# Patient Record
Sex: FEMALE | Race: WHITE
[De-identification: names, ages, dates, MRNs, and addresses within clinical notes are randomized per-mention and may not be internally consistent; named-entity substitution may affect disease eponyms.]

---

## 2020-01-04 NOTE — PCM.POSTAN
POST ANESTHESIA ASSESSMENT





- MENTAL STATUS


Mental Status: Alert





- VITAL SIGNS


Vital Signs: 


 Last Vital Signs











Temp  36.1 C   01/04/20 01:57


 


Pulse  66   01/04/20 02:20


 


Resp  8 L  01/04/20 02:20


 


BP  102/69   01/04/20 02:20


 


Pulse Ox  98   01/04/20 02:20














- RESPIRATORY


Respiratory Status: Respiratory Rate WNL





- CARDIOVASCULAR


CV Status: Pulse Rate WNL





- GASTROINTESTINAL


GI Status: No Symptoms


Free Text/Narrative:: 





Nausea from OR resolved.





- PAIN


Pain Score: 0 (Spinal regressing)





- POST OP HYDRATION


Hydration Status: Adequate & Stable (Doing well.  Will move to phase 2.)

## 2020-01-04 NOTE — PCM48HPAN
Post Anesthesia Note





- EVALUATION WITHIN 48HRS OF ANESTHETIC


Vital Signs in Normal Range: Yes


Patient Participated in Evaluation: Yes


Respiratory Function Stable: Yes


Airway Patent: Yes


Cardiovascular Function Stable: Yes


Hydration Status Stable: Yes


Pain Control Satisfactory: Yes (Very comfortable.  )


Nausea and Vomiting Control Satisfactory: Yes


Mental Status Recovered: Yes


Vital Signs: 


 Last Vital Signs











Temp  36.1 C   01/04/20 07:23


 


Pulse  78   01/04/20 07:23


 


Resp  18   01/04/20 07:23


 


BP  119/69   01/04/20 07:23


 


Pulse Ox  98   01/04/20 07:23














- COMMENTS/OBSERVATIONS


Free Text/Narrative:: 





Doing well.  No problems noted at present.  Progressing well.

## 2020-01-04 NOTE — PCM.PREANE
Preanesthetic Assessment





- Procedure


Proposed Procedure: 





Version possible .





- Anesthesia/Transfusion/Family Hx


Anesthesia History: Prior Anesthesia Without Reaction


Family History of Anesthesia Reaction: No





- Review of Systems


General: No Symptoms


Pulmonary: No Symptoms


Cardiovascular: No Symptoms


Gastrointestinal: Other (Some nausea at present)


Neurological: No Symptoms, Other (Hx Fibro)


Other: Reports: None, Depression





- Physical Assessment


NPO Status Date: 20


NPO Status Time: 00:05


Height: 1.68 m


Weight: 87.997 kg


ASA Class: 2E


Mental Status: Alert & Oriented x3


Airway Class: Mallampati = 2


Dentition: Reports: Normal Dentition


Thyro-Mental Finger Breadths: 3


Mouth Opening Finger Breadths: 3


ROM/Head Extension: Full


Lungs: Clear to Auscultation


Cardiovascular: Regular Rate





- Lab


Values: 





 Laboratory Last Values











WBC  9.35 K/uL (4.0-11.0)   20  23:38    


 


RBC  4.40 M/uL (4.30-5.90)   20  23:38    


 


Hgb  12.5 g/dL (12.0-16.0)   20  23:38    


 


Hct  37.3 % (36.0-46.0)   20  23:38    


 


MCV  84.8 fL (80.0-98.0)   20  23:38    


 


MCH  28.4 pg (27.0-32.0)   20  23:38    


 


MCHC  33.5 g/dL (31.0-37.0)   20  23:38    


 


RDW Std Deviation  42.8 fl (28.0-62.0)   20  23:38    


 


RDW Coeff of Lissa  14 % (11.0-15.0)   20  23:38    


 


Plt Count  219 K/uL (150-400)   20  23:38    


 


MPV  9.60 fL (7.40-12.00)   20  23:38    


 


Nucleated RBC %  0.0 /100WBC  20  23:38    


 


Nucleated RBCs #  0 K/uL  20  23:38    














- Allergies


Allergies/Adverse Reactions: 


 Allergies











Allergy/AdvReac Type Severity Reaction Status Date / Time


 


No Known Allergies Allergy   Verified 20 23:22














- Blood


Blood Available: No





- Anesthesia Plan


Pre-Op Medication Ordered: None





- Acknowledgements


Anesthesia Type Planned: Spinal


Pt an Appropriate Candidate for the Planned Anesthesia: Yes


Alternatives and Risks of Anesthesia Discussed w Pt/Guardian: Yes


Pt/Guardian Understands and Agrees with Anesthesia Plan: Yes


Additional Comments: 





Hx noted.  Discussed.  ? answered.  Wishes to proceed.





PreAnesthesia Questionnaire


Other Gastrointestinal History: H. Pylori





- HOME MEDS


Home Medications: 


 Home Meds





Pnv No.95/Ferrous Fum/Folic AC [Prenatal Tablet] 1 tab PO DAILY 20 [

History]


Sertraline [Zoloft] 1 tab PO DAILY 20 [History]











- CURRENT (IN HOUSE) MEDS


Current Meds: 





 Current Medications





Lactated Ringer's (Ringers, Lactated)  1,000 mls @ 500 mls/hr IV BOLUS ANA


Oxytocin/Sodium Chloride (Oxytocin 30 Unit/500 Ml-Ns)  30 unit in 500 mls @ 250 

mls/hr IV TITRATE ANA


Sodium Chloride (Saline Flush)  10 ml FLUSH ASDIRECTED PRN


   PRN Reason: Keep Vein Open


Sodium Chloride (Saline Flush)  2.5 ml FLUSH ASDIRECTED PRN


   PRN Reason: Keep Vein Open


Sodium Chloride (Normal Saline)  10 ml IV ASDIRECTED PRN


   PRN Reason: IV Use





Discontinued Medications





Citric Acid/Sodium Citrate (Bicitra Solution)  30 ml PO ONETIME ONE


   Stop: 20 23:25


Cefazolin Sodium/Dextrose 2 gm (/ Premix)  50 mls @ 100 mls/hr IV ONETIME ONE


   Stop: 20 23:53

## 2020-01-04 NOTE — OR
SURGEON:

Arpan Kelly MD

 

DATE OF PROCEDURE:  2020

 

INDICATION:

A 24-year-old, -0-1-1 female, at 37 weeks and 3 days, presenting with

spontaneous labor.  The patient had regular contractions and progressed from 

2cm in the office to 5 cm.  Baby was in breech presentation and was

scheduled for external cephalic version. She desired a trial of external 
cephalic 

version before proceeding with primary  section. Complete breech 

presentation was confirmed by bedside ultrasound.  External

cephalic version was attempted at bedside and was unsuccessful.  Fetal heart

rate was category 1 tracing.  After discussion with the patient, she was

agreeable to  section.  Risks of procedure were discussed with the

patient.  Questions answered and consent was signed.

 

PREOPERATIVE DIAGNOSES:

1. Garcia intrauterine pregnancy at 37 weeks and 1 day.

2. Complete breech presentation.

 

POSTOPERATIVE DIAGNOSES:

1. Garcia intrauterine pregnancy at 37 weeks and 1 day.

2. Complete breech presentation.

 

PROCEDURE:

Low-transverse  section.

 

ESTIMATED BLOOD LOSS:

800 mL.

 

ANESTHESIA:

Spinal.

 

ANESTHESIOLOGIST:

Jose Luis Bill M.D.

 

FINDINGS:

Garcia intrauterine pregnancy in complete breech presentation.  

Viable male fetus. Weight of 7lbs 15oz. Apgar of 8 and 9.  

Normal appearing uterus, bilateral fallopian tubes and ovaries.

 

DESCRIPTION OF PROCEDURE:

The patient was brought to the operating room.  She received 2 g of Ancef and

pneumatic stockings.  Spinal anesthesia was applied.  Wagner was placed.  The

abdomen was prepped with chlorhexidine in sterile fashion.  She was draped and

tested for anesthesia, the spinal was adequate.  A Pfannenstiel incision was

made with a scalpel and dissected down to fascia.  Sites of bleeding were

cauterized.  The fascia was cleared of subcutaneous tissue.  The fascia was then

incised in the midline and extended laterally with curved Kamara scissors.  
Kocher 

clamps were placed on the superior fascial edge.  The rectus muscles were then 

 from the fascia by blunt dissection and using Kamara scissors.  The 
rectus 

muscle was  inferiorly in similar fashion.  The peritoneum was 
identified 

and an opening was made bluntly, then stretched.  The Armen retractor was 

placed in the peritoneal cavity.  The bladder was noted to be far away from the 

site of incision.  The uterus was incised transversely at the lower uterine 
segment 

using scalpel and extended bluntly.  Clear amniotic fluid was noted.  The 
infant 

was noted to be in complete breech presentation. The hips were grasped and 
turned 

sacrum anterior and delivered through the hysterotomy followed by the legs.  

The right arm was delivered by rotating the right shoulder anteriorly and 
sweeping 

the arm towards the body.  The left arm and shoulder were delivered in similar 

fashion.  The fetal head was then delivered with flexion of the fetal chin. The 
nose 

and mouth were suctioned. The umbilical cord was clamped and cut after 

30 seconds and no longer pulsating. The infant was pink, crying vigorously and 

moving all extremities.  The infant was handed over to nursery staff and 
pediatrician.  



The cord gases were obtained. The placenta was delivered manually.  The uterus 

was cleared of any remaining membranes with a clean lap.  Allis clamps were 
used 

to grasp the angles and lower edges of the incision.  The uterine incision was 
closed 

using 0 Monocryl in running nonlocking fashion.  Small sites of bleeding were 
noted at the

midline.  Figure-of-eight sutures were placed with 0 Vicryl for hemostasis.  The

uterus was firm.  Paracolic gutters were cleared of any clots.  The incision was

checked again for hemostasis. Small areas of oozing were cauterized with Bovie.

The peritoneum was grasped by Wesley clamps and closed using 2-0 Vicryl in

running fashion. The rectus muscles were examined and found to be hemostatic.

The fascia was closed using 0 Vicryl suture in a running fashion.  The

subcutaneous tissue was irrigated and bleeding areas cauterized.  The

subcutaneous layer was closed with running suture of 0 chromic.  The skin was

closed subcuticularly with 3-0 Monocryl on a Ector needle.  Telfa and ABD

dressings were placed over the incision.  



The patient was stable and transferred to recovery room.  She was given 
postoperative 

care instructions.

 

 

ROBERT DAVIS

DD:  2020 04:56:46

DT:  2020 05:30:42

Job #:  [id number]

KEITH

## 2020-01-05 NOTE — PCM.PNPP
- General Info


Date of Service: 20


Functional Status: Reports: Pain Controlled, Tolerating Diet, Ambulating, 

Urinating, Other (Passing flatus. Bleeding minimal.)





- Review of Systems


General: Reports: No Symptoms


HEENT: Reports: No Symptoms


Pulmonary: Reports: No Symptoms


Cardiovascular: Reports: No Symptoms


Gastrointestinal: Reports: No Symptoms


Genitourinary: Reports: No Symptoms


Musculoskeletal: Reports: No Symptoms


Skin: Reports: No Symptoms


Neurological: Reports: No Symptoms


Psychiatric: Reports: No Symptoms





- Patient Data


Vital Signs - Most Recent: 


 Last Vital Signs











Temp  36.4 C   20 07:56


 


Pulse  77   20 07:56


 


Resp  16   20 07:56


 


BP  104/55 L  20 07:56


 


Pulse Ox  97   20 07:56











Weight - Most Recent: 194 lb


I&O - Last 24 Hours: 


 Intake & Output











 20





 22:59 06:59 14:59


 


Intake Total 1360 900 450


 


Output Total 1150 1000 900


 


Balance 210 -100 -450











Lab Results - Last 24 Hours: 


 Laboratory Results - last 24 hr











  20 Range/Units





  05:55 


 


Hgb  10.5 L  (12.0-16.0)  g/dL


 


Hct  33.0 L  (36.0-46.0)  %











Med Orders - Current: 


 Current Medications





Acetaminophen (Tylenol)  650 mg PO Q6H PRN


   PRN Reason: Pain (moderate 4-6)


Hydrocodone Bitart/Acetaminophen (Norco 325-5 Mg)  1 tab PO Q4H PRN


   PRN Reason: Pain (moderate 4-6)


Bisacodyl (Dulcolax)  10 mg RECTAL ONETIME PRN


   PRN Reason: Constipation


Diphenhydramine HCl (Benadryl)  25 mg IVPUSH Q6H PRN


   PRN Reason: Itching or Nausea


Docusate Sodium (Colace)  100 mg PO BID [facility]


   Last Admin: 20 08:47 Dose:  100 mg


Emollient Ointment (Lansinoh Hpa)  0 gm TOP ASDIRECTED PRN


   PRN Reason: Sore Nipples


Lactated Ringer's (Ringers, Lactated)  1,000 mls @ 500 mls/hr IV BOLUS [facility]


   Last Admin: 01/04/20 00:16 Dose:  999 mls/hr


Oxytocin/Sodium Chloride (Oxytocin 30 Unit/500 Ml-Ns)  30 unit in 500 mls @ 250 

mls/hr IV TITRATE [facility]


Tranexamic Acid 1,000 mg/ (Sodium Chloride)  110 mls @ 660 mls/hr IV ONETIME PRN


   PRN Reason: Bleeding


Lactated Ringer's (Ringers, Lactated)  1,000 mls @ 125 mls/hr IV ASDIRECTED [facility]


   Last Admin: 20 04:44 Dose:  125 mls/hr


Ibuprofen (Motrin)  800 mg PO Q8H PRN


   PRN Reason: mild pain or fever


Methylergonovine Maleate (Methergine)  0.2 mg IM ONETIME PRN


   PRN Reason: Excessive Vaginal Bleeding


Misoprostol (Cytotec)  1,000 mcg RECTAL ONETIME PRN


   PRN Reason: excessive bleeding


Ondansetron HCl (Zofran)  4 mg IVPUSH Q4H PRN


   PRN Reason: Nausea/Vomiting


Oxycodone/Acetaminophen (Percocet 325-5 Mg)  1 tab PO Q4H PRN


   PRN Reason: Pain (moderate 4-6)


Oxycodone/Acetaminophen (Percocet 325-5 Mg)  2 tab PO Q4H PRN


   PRN Reason: Pain (moderate 4-6)


Oxytocin (Pitocin)  10 unit IM ASDIRECTED PRN


   PRN Reason: Excessive Vaginal Bleeding


Sodium Chloride (Saline Flush)  10 ml FLUSH ASDIRECTED PRN


   PRN Reason: Keep Vein Open


Sodium Chloride (Saline Flush)  2.5 ml FLUSH ASDIRECTED PRN


   PRN Reason: Keep Vein Open


Sodium Chloride (Normal Saline)  10 ml IV ASDIRECTED PRN


   PRN Reason: IV Use


Sodium Chloride (Saline Flush)  10 ml FLUSH ASDIRECTED PRN


   PRN Reason: Keep Vein Open


Sodium Chloride (Saline Flush)  2.5 ml FLUSH ASDIRECTED PRN


   PRN Reason: Keep Vein Open





Discontinued Medications





Cefazolin Sodium (Ancef) Confirm Administered Dose 2 gm .ROUTE .STK-MED ONE


   Stop: 20 00:14


Citric Acid/Sodium Citrate (Bicitra Solution)  30 ml PO ONETIME ONE


   Stop: 20 23:25


   Last Admin: 20 02:28 Dose:  Not Given


Ephedrine Sulfate (Ephedrine Sulfate) Confirm Administered Dose 50 mg .ROUTE 

.STK-MED ONE


   Stop: 20 00:14


Ephedrine Sulfate (Ephedrine Sulfate) Confirm Administered Dose 350 mg .ROUTE 

.STK-MED ONE


   Stop: 20 00:25


Fentanyl (Sublimaze)  50 mcg IVPUSH Q5M PRN


   PRN Reason: Pain (severe 7-10)


   Stop: 20 01:08


Cefazolin Sodium/Dextrose 2 gm (/ Premix)  50 mls @ 100 mls/hr IV ONETIME ONE


   Stop: 20 23:53


   Last Admin: 20 02:29 Dose:  Not Given


Sodium Chloride (Normal Saline) Confirm Administered Dose 40 mls @ as directed 

.ROUTE .STK-MED ONE


   Stop: 20 00:14


Acetaminophen (Ofirmev) Confirm Administered Dose 100 mls @ as directed .ROUTE 

.STK-MED ONE


   Stop: 20 00:20


   Last Admin: 20 09:35 Dose:  Not Given


Ketorolac Tromethamine (Toradol)  30 mg IVPUSH Q6H ANA


   Stop: 20 02:01


   Last Admin: 20 03:17 Dose:  30 mg


Morphine Sulfate (Duramorph Pf) Confirm Administered Dose 10 mg .ROUTE .STK-MED 

ONE


   Stop: 20 00:17


Nalbuphine HCl (Nubain)  2.5 mg IVPUSH Q3H PRN


   PRN Reason: Pruritis


   Stop: 20 01:09


Ondansetron HCl (Zofran) Confirm Administered Dose 4 mg .ROUTE .STK-MED ONE


   Stop: 20 00:14


Oxytocin (Pitocin) Confirm Administered Dose 20 unit .ROUTE .STK-MED ONE


   Stop: 20 00:45


Phenylephrine HCl (Phenylephrine In Ns 100 Mcg/Ml) Confirm Administered Dose 1 

mg .ROUTE .STK-MED ONE


   Stop: 20 00:14











- Infant Interaction


Infant Disposition, Postpartum: [place] at Bedside


Infant Interaction: Holding Infant


Infant Feeding:  Infant; Nursed Well


Support Person: 





- Postpartum Recovery Exam


Fundal Tone: Firm


Fundal Level: 2 Fingerbreadths Below Umbilicus


Fundal Placement: Midline


Lochia Amount: Scant


Lochia Color: Rubra/Red


Episiotomy/Laceration: None


Bladder Status: Voiding


Urinary Elimination: Voided





- Exam


General: Alert, Oriented, Cooperative, No Acute Distress


HEENT: Pupils Equal, Pupils Reactive


Neck: Supple, Trachea Midline


Lungs: Normal Respiratory Effort


GI/Abdominal Exam: Soft, Non-Tender, No Distention


Extremities: Normal Inspection, Normal Range of Motion, Non-Tender, No Pedal 

Edema


Skin: Warm, Dry, Intact


Wound/Incisions: Healing Well


Neurological: No New Focal Deficit


Psy/Mental Status: Alert, Normal Affect, Normal Mood





- Problem List Review


Problem List Initiated/Reviewed/Updated: Yes





- My Orders


Last 24 Hours: 


My Active Orders





20 08:00


Ibuprofen [Motrin]   800 mg PO Q8H PRN 





20 12:10


Acetaminophen [Tylenol]   650 mg PO Q6H PRN 














- Assessment


Assessment:: 





25yo  POD1 s/p LTCS for labor with breech presentation. Stable and 

recovering well. 





- Plan


Plan:: 





Vitals stable


Hgb 10.5, minimal bleeding. Denies s/s of anemia, advised to continue PNV.


Tolerating PO, passing flatus, continue stool softeners


Baby doing well, no longer requiring O2


Anticipate discharge home tomorrow

## 2020-01-06 NOTE — PCM.PNPP
- General Info


Date of Service: 20


Functional Status: Reports: Pain Controlled, Tolerating Diet, Ambulating, 

Urinating





- Review of Systems


General: Denies: Fever, Weakness, Fatigue


Pulmonary: Denies: Shortness of Breath


Cardiovascular: Denies: Chest Pain, Palpitations, Lightheadedness


Gastrointestinal: Denies: Abdominal Pain, Nausea, Vomiting


Genitourinary: Denies: Flank Pain


Musculoskeletal: Reports: No Symptoms


Skin: Reports: No Symptoms


Neurological: Reports: No Symptoms


Psychiatric: Reports: No Symptoms





- General Info


Date of Service: 20





- Patient Data


Vital Signs - Most Recent: 


 Last Vital Signs











Temp  36.5 C   20 08:00


 


Pulse  78   20 08:00


 


Resp  18   20 08:00


 


BP  116/64   20 08:00


 


Pulse Ox  97   20 08:00











Weight - Most Recent: 87.997 kg


I&O - Last 24 Hours: 


 Intake & Output











 20





 22:59 06:59 14:59


 


Intake Total 570  


 


Output Total 800  


 


Balance -230  











Med Orders - Current: 


 Current Medications





Acetaminophen (Tylenol)  650 mg PO Q6H PRN


   PRN Reason: Pain (moderate 4-6)


   Last Admin: 20 13:44 Dose:  650 mg


Hydrocodone Bitart/Acetaminophen (Norco 325-5 Mg)  1 tab PO Q4H PRN


   PRN Reason: Pain (moderate 4-6)


Bisacodyl (Dulcolax)  10 mg RECTAL ONETIME PRN


   PRN Reason: Constipation


Diphenhydramine HCl (Benadryl)  25 mg IVPUSH Q6H PRN


   PRN Reason: Itching or Nausea


Docusate Sodium (Colace)  100 mg PO BID [facility]


   Last Admin: 20 21:19 Dose:  100 mg


Emollient Ointment (Lansinoh Hpa)  0 gm TOP ASDIRECTED PRN


   PRN Reason: Sore Nipples


Lactated Ringer's (Ringers, Lactated)  1,000 mls @ 500 mls/hr IV BOLUS [facility]


   Last Admin: 20 00:16 Dose:  999 mls/hr


Oxytocin/Sodium Chloride (Oxytocin 30 Unit/500 Ml-Ns)  30 unit in 500 mls @ 250 

mls/hr IV TITRATE [facility]


Tranexamic Acid 1,000 mg/ (Sodium Chloride)  110 mls @ 660 mls/hr IV ONETIME PRN


   PRN Reason: Bleeding


Lactated Ringer's (Ringers, Lactated)  1,000 mls @ 125 mls/hr IV ASDIRECTED [facility]


   Last Admin: 20 04:44 Dose:  125 mls/hr


Ibuprofen (Motrin)  800 mg PO Q8H PRN


   PRN Reason: mild pain or fever


   Last Admin: 20 12:21 Dose:  800 mg


Methylergonovine Maleate (Methergine)  0.2 mg IM ONETIME PRN


   PRN Reason: Excessive Vaginal Bleeding


Misoprostol (Cytotec)  1,000 mcg RECTAL ONETIME PRN


   PRN Reason: excessive bleeding


Ondansetron HCl (Zofran)  4 mg IVPUSH Q4H PRN


   PRN Reason: Nausea/Vomiting


Oxycodone/Acetaminophen (Percocet 325-5 Mg)  1 tab PO Q4H PRN


   PRN Reason: Pain (moderate 4-6)


   Last Admin: 20 18:57 Dose:  1 tab


Oxycodone/Acetaminophen (Percocet 325-5 Mg)  2 tab PO Q4H PRN


   PRN Reason: Pain (moderate 4-6)


   Last Admin: 20 06:27 Dose:  2 tab


Oxytocin (Pitocin)  10 unit IM ASDIRECTED PRN


   PRN Reason: Excessive Vaginal Bleeding


Sodium Chloride (Saline Flush)  10 ml FLUSH ASDIRECTED PRN


   PRN Reason: Keep Vein Open


Sodium Chloride (Saline Flush)  2.5 ml FLUSH ASDIRECTED PRN


   PRN Reason: Keep Vein Open


Sodium Chloride (Normal Saline)  10 ml IV ASDIRECTED PRN


   PRN Reason: IV Use


Sodium Chloride (Saline Flush)  10 ml FLUSH ASDIRECTED PRN


   PRN Reason: Keep Vein Open


Sodium Chloride (Saline Flush)  2.5 ml FLUSH ASDIRECTED PRN


   PRN Reason: Keep Vein Open





Discontinued Medications





Acetaminophen (Tylenol Extra Strength)  1,000 mg PO Q4H PRN


   PRN Reason: Pain/Fever


Cefazolin Sodium (Ancef) Confirm Administered Dose 2 gm .ROUTE .STK-MED ONE


   Stop: 20 00:14


Citric Acid/Sodium Citrate (Bicitra Solution)  30 ml PO ONETIME ONE


   Stop: 20 23:25


   Last Admin: 20 02:28 Dose:  Not Given


Ephedrine Sulfate (Ephedrine Sulfate) Confirm Administered Dose 50 mg .ROUTE 

.STK-MED ONE


   Stop: 20 00:14


Ephedrine Sulfate (Ephedrine Sulfate) Confirm Administered Dose 350 mg .ROUTE 

.STK-MED ONE


   Stop: 20 00:25


Fentanyl (Sublimaze)  50 mcg IVPUSH Q5M PRN


   PRN Reason: Pain (severe 7-10)


   Stop: 20 01:08


Cefazolin Sodium/Dextrose 2 gm (/ Premix)  50 mls @ 100 mls/hr IV ONETIME ONE


   Stop: 20 23:53


   Last Admin: 20 02:29 Dose:  Not Given


Sodium Chloride (Normal Saline) Confirm Administered Dose 40 mls @ as directed 

.ROUTE .STK-MED ONE


   Stop: 20 00:14


Acetaminophen (Ofirmev) Confirm Administered Dose 100 mls @ as directed .ROUTE 

.STK-MED ONE


   Stop: 20 00:20


   Last Admin: 20 09:35 Dose:  Not Given


Ketorolac Tromethamine (Toradol)  30 mg IVPUSH Q6H ANA


   Stop: 20 02:01


   Last Admin: 20 03:17 Dose:  30 mg


Morphine Sulfate (Duramorph Pf) Confirm Administered Dose 10 mg .ROUTE .STK-MED 

ONE


   Stop: 20 00:17


Nalbuphine HCl (Nubain)  2.5 mg IVPUSH Q3H PRN


   PRN Reason: Pruritis


   Stop: 20 01:09


Ondansetron HCl (Zofran) Confirm Administered Dose 4 mg .ROUTE .STK-MED ONE


   Stop: 20 00:14


Oxytocin (Pitocin) Confirm Administered Dose 20 unit .ROUTE .STK-MED ONE


   Stop: 20 00:45


Phenylephrine HCl (Phenylephrine In Ns 100 Mcg/Ml) Confirm Administered Dose 1 

mg .ROUTE .STK-MED ONE


   Stop: 20 00:14











- Infant Interaction


Infant Disposition, Postpartum: [place] at Bedside


Infant Interaction: Holding Infant


Infant Feeding:  Infant; Nursed Well


Support Person: 





- Postpartum Recovery Exam


Fundal Tone: Firm


Fundal Level: 2 Fingerbreadths Below Umbilicus


Fundal Placement: Midline


Lochia Amount: Scant


Lochia Color: Rubra/Red


Perineum Description: Intact, Minimal Bruising/Swelling


Episiotomy/Laceration: None


Bladder Status: Voiding


Urinary Elimination: Voided





- Exam


General: Alert, Oriented


Lungs: Normal Respiratory Effort


Cardiovascular: Regular Rate, Regular Rhythm


GI/Abdominal Exam: Normal Bowel Sounds, Soft


Extremities: Pedal Edema (trace).  No: Claus's Sign


Skin: Warm, Dry, Intact


Wound/Incisions: Healing Well, No Drainage.  No: Erythema


Neurological: No New Focal Deficit


Psy/Mental Status: Alert, Normal Affect, Normal Mood





- Problem List & Annotations


(1) Delivery by  section


SNOMED Code(s): 905832861


   Code(s): [id number] -    Status: Acute   Current Visit: Yes   





- Problem List Review


Problem List Initiated/Reviewed/Updated: Yes





- My Orders


Last 24 Hours: 


My Active Orders





20 10:26


Ready for Discharge [RC] PER UNIT ROUTINE 














- Assessment


Assessment:: 





25yo  POD2 s/p LTCS for labor with breech presentation. Stable and 

recovering well. 





- Plan


Plan:: 





Doing well overall, VS are stable. Infant is breastfeeding well. Would like to 

go home. Discharge instructions reviewed. Follow up at [facility] 2 and 6 weeks. 

Continue PNV daily.  Infection and bleeding warnings reviewed.  Discharge to 

home.

## 2021-04-14 ENCOUNTER — HOSPITAL ENCOUNTER (EMERGENCY)
Dept: HOSPITAL 56 - MW.ED | Age: 26
LOS: 1 days | Discharge: PSYCHIATRIC OR SUBSTANCE USE FACILITY | End: 2021-04-15
Payer: COMMERCIAL

## 2021-04-14 DIAGNOSIS — Z79.899: ICD-10-CM

## 2021-04-14 DIAGNOSIS — F32.9: Primary | ICD-10-CM

## 2021-04-14 DIAGNOSIS — Z20.822: ICD-10-CM

## 2021-04-14 PROCEDURE — 83735 ASSAY OF MAGNESIUM: CPT

## 2021-04-14 PROCEDURE — 80053 COMPREHEN METABOLIC PANEL: CPT

## 2021-04-14 PROCEDURE — 80179 DRUG ASSAY SALICYLATE: CPT

## 2021-04-14 PROCEDURE — 81001 URINALYSIS AUTO W/SCOPE: CPT

## 2021-04-14 PROCEDURE — 99285 EMERGENCY DEPT VISIT HI MDM: CPT

## 2021-04-14 PROCEDURE — U0002 COVID-19 LAB TEST NON-CDC: HCPCS

## 2021-04-14 PROCEDURE — 80178 ASSAY OF LITHIUM: CPT

## 2021-04-14 PROCEDURE — 93005 ELECTROCARDIOGRAM TRACING: CPT

## 2021-04-14 PROCEDURE — 80305 DRUG TEST PRSMV DIR OPT OBS: CPT

## 2021-04-14 PROCEDURE — 80307 DRUG TEST PRSMV CHEM ANLYZR: CPT

## 2021-04-14 PROCEDURE — 87635 SARS-COV-2 COVID-19 AMP PRB: CPT

## 2021-04-14 PROCEDURE — 85025 COMPLETE CBC W/AUTO DIFF WBC: CPT

## 2021-04-14 PROCEDURE — 81025 URINE PREGNANCY TEST: CPT

## 2021-04-14 PROCEDURE — 80143 DRUG ASSAY ACETAMINOPHEN: CPT

## 2021-04-14 PROCEDURE — 84443 ASSAY THYROID STIM HORMONE: CPT

## 2021-04-14 PROCEDURE — 36415 COLL VENOUS BLD VENIPUNCTURE: CPT

## 2021-04-15 VITALS — HEART RATE: 67 BPM | SYSTOLIC BLOOD PRESSURE: 97 MMHG | DIASTOLIC BLOOD PRESSURE: 45 MMHG

## 2021-04-15 LAB
APAP SERPL-MCNC: <2 UG/ML
BUN SERPL-MCNC: 15 MG/DL (ref 7–18)
CHLORIDE SERPL-SCNC: 104 MMOL/L (ref 98–107)
CO2 SERPL-SCNC: 27.3 MMOL/L (ref 21–32)
GLUCOSE SERPL-MCNC: 113 MG/DL (ref 74–106)
POTASSIUM SERPL-SCNC: 3.5 MMOL/L (ref 3.5–5.1)
SODIUM SERPL-SCNC: 140 MMOL/L (ref 136–145)

## 2021-04-15 NOTE — EDM.PDOC
ED HPI GENERAL MEDICAL PROBLEM





- General


Chief Complaint: Behavioral/Psych


Stated Complaint: SUICIDAL THOUGHTS


Time Seen by Provider: 04/15/21 00:09





- History of Present Illness


INITIAL COMMENTS - FREE TEXT/NARRATIVE: 





HISTORY AND PHYSICAL:





History of present illness:


This is a 25-year-old female with a history significant for depression and 

anxiety who presents ER today secondary to having suicidal thoughts that started

today.  Patient reports that she had mentioned it to her  that she was 

having thoughts about overdosing on her lithium, her  then called her 

mother who brought her into the ED for further evaluation of her SI.  Patient 

denies any history of hypertension, diabetes, liver, lung, kidney problems.  

Patient reports that she has had a recent .  Patient has no known drug 

allergies.  Patient denies a tobacco alcohol or drugs.  Patient reports that she

does use occasional alcohol and last drink was approximately 5 days ago.





Patient reports that she takes lithium for her depression.  Patient reports no 

prior SI or suicide attempts in the past.  Patient denies any attempt/overdose 

however she does have thoughts about overdosing.  Patient reports she is unclear

as to why she is having SI thoughts and has no specific stressor that she is 

able to express.  Patient denies any recent fevers, shakes, chills, nausea, 

vomiting, diarrhea, dysuria, frequency, urgency, chest pain, shortness of 

breath, abdominal pain.  Patient reports that she was in her usual state of 

health.





Review of systems: 


As per history of present illness and below otherwise all systems reviewed and 

negative.





Past medical history: 


As per history of present illness and as reviewed below otherwise 

noncontributory.





Surgical history: 


As per history of present illness and as reviewed below otherwise noncont

ributory.





Social history: 


No reported history of drug or alcohol abuse.





Family history: 


As per history of present illness and as reviewed below otherwise 

noncontributory.





Physical exam:





This patient was seen and evaluated during the  SARS-CoV-2 novel coronavirus

pandemic period.  Community viral transmission is ongoing at time of this 

encounter and the emergency department is operating under pandemic response 

procedures.





Constitutional: Patient is oriented to person, place, and time.  Appears well-

developed and well-nourished.  No distress.


 HEENT: Moist mucous membranes


 Head: Normocephalic and atraumatic


 Eyes: Right eye exhibits no discharge.  Left eye exhibits no discharge.  No 

scleral icterus


 Neck: Normal range of motion.  No tracheal deviation present.


 Cardiovascular: Normal rate and regular rhythm.


 Pulmonary: Effort normal, no respiratory distress.


 Abdominal: No distention


 Musculoskeletal: Normal range of motion


 Neurologic: Alert and oriented to person, place and time.


 Skin: Pink, warm and dry.  


 Psychiatric: Depressed affect with positive SI.  Patient denies HI.  Behavior 

is normal.  Judgment and thought content normal.


 Nursing note and vital signs have been reviewed











Diagnostics:


Mental health panel





Therapeutics:


Suicide precautions


Assessment and plan:


This is a 25-year-old female with a history of depression and anxiety who 

presents ER today secondary to new onset suicidal ideation and plan to overdose 

on her lithium tablets.  Patient has not overdosed on any medications nor has 

she had any episodes in the past of suicidal ideation or attempt.  In the ED, 

patient will be medically cleared and I have discussed with her the need to 

transfer to a psychiatric facility for inpatient evaluation.  Patient is 

extremely amenable to this plan at this time.








Patient's labs were within normal limits.  Patient's Covid test is negative.





EKG:


As interpreted by ER physician: Nemo:


Nonspecific ST-T wave abnormalities


Normal axis


No evidence of ST elevation MI


Normal sinus rhythm heart rate of 61





Case discussed with [facility], no beds available


Case discussed with [facility], Dr. Hauser has agreed to accept 

patient for transfer.





Definitive disposition and diagnosis as appropriate pending reevaluation and 

review of above.








- Related Data


                                    Allergies











Allergy/AdvReac Type Severity Reaction Status Date / Time


 


No Known Allergies Allergy   Verified 21 23:49











Home Meds: 


                                    Home Meds





FLUoxetine HCl [Fluoxetine] 40 mg PO DAILY 21 [History]


Lithium Carbonate 300 mg PO DAILY 21 [History]


QUEtiapine [SEROquel] 50 mg PO DAILY 21 [History]


Topiramate 25 mg PO DAILY 21 [History]











Past Medical History


HEENT History: Reports: Impaired Vision


Cardiovascular History: Reports: None


Respiratory History: Reports: None


Other Gastrointestinal History: H. Pylori


Genitourinary History: Reports: None


OB/GYN History: Reports: Pregnancy, Spontaneous 


Musculoskeletal History: Reports: None


Neurological History: Reports: None


Psychiatric History: Reports: Anxiety, Depression, Suicidal Ideation


Endocrine/Metabolic History: Reports: None


Insulin Pump Model and : None


Hematologic History: Reports: None


Immunologic History: Reports: None


Oncologic (Cancer) History: Reports: None


Dermatologic History: Reports: None





- Infectious Disease History


Infectious Disease History: Reports: None





- Past Surgical History


Head Surgeries/Procedures: Reports: None


HEENT Surgical History: Reports: Oral Surgery, Other (See Below)


GI Surgical History: Reports: None





Social & Family History





- Family History


OBGYN: Reports: Pregnancy


Psychiatric: Reports: Other (See Below)


Other Psychiatric Family History: alcoholism





- Caffeine Use


Caffeine Use: Reports: Soda





- Recreational Drug Use


Recreational Drug Use: No





ED ROS GENERAL





- Review of Systems


Review Of Systems: See Below





ED EXAM, GENERAL





- Physical Exam


Exam: See Below





Course





- Vital Signs


Last Recorded V/S: 


                                Last Vital Signs











Temp  98.1 F   21 23:35


 


Pulse  60   04/15/21 01:45


 


Resp  18   04/15/21 01:45


 


BP  117/66   04/15/21 01:45


 


Pulse Ox  97   04/15/21 01:45














- Orders/Labs/Meds


Orders: 


                               Active Orders 24 hr











 Category Date Time Status


 


 Communication Order [RC] STAT Care  21 23:58 Active


 


 EKG Documentation Completion [RC] STAT Care  21 23:39 Active


 


 LITHIUM [REF] Stat Lab  04/15/21 00:05 Received











Labs: 


                                Laboratory Tests











  21 Range/Units





  23:45 23:45 23:45 


 


WBC     (4.0-11.0)  K/uL


 


RBC     (4.30-5.90)  M/uL


 


Hgb     (12.0-16.0)  g/dL


 


Hct     (36.0-46.0)  %


 


MCV     (80.0-98.0)  fL


 


MCH     (27.0-32.0)  pg


 


MCHC     (31.0-37.0)  g/dL


 


RDW Std Deviation     (28.0-62.0)  fl


 


RDW Coeff of Lissa     (11.0-15.0)  %


 


Plt Count     (150-400)  K/uL


 


MPV     (7.40-12.00)  fL


 


Neut % (Auto)     (48.0-80.0)  %


 


Lymph % (Auto)     (16.0-40.0)  %


 


Mono % (Auto)     (0.0-15.0)  %


 


Eos % (Auto)     (0.0-7.0)  %


 


Baso % (Auto)     (0.0-1.5)  %


 


Neut # (Auto)     (1.4-5.7)  K/uL


 


Lymph # (Auto)     (0.6-2.4)  K/uL


 


Mono # (Auto)     (0.0-0.8)  K/uL


 


Eos # (Auto)     (0.0-0.7)  K/uL


 


Baso # (Auto)     (0.0-0.1)  K/uL


 


Nucleated RBC %     /100WBC


 


Nucleated RBCs #     K/uL


 


Sodium     (136-145)  mmol/L


 


Potassium     (3.5-5.1)  mmol/L


 


Chloride     ()  mmol/L


 


Carbon Dioxide     (21.0-32.0)  mmol/L


 


BUN     (7.0-18.0)  mg/dL


 


Creatinine     (0.6-1.0)  mg/dL


 


Est Cr Clr Drug Dosing     mL/min


 


Estimated GFR (MDRD)     ml/min


 


Glucose     ()  mg/dL


 


Calcium     (8.5-10.1)  mg/dL


 


Magnesium     (1.8-2.4)  mg/dL


 


Total Bilirubin     (0.2-1.0)  mg/dL


 


AST     (15-37)  IU/L


 


ALT     (14-63)  IU/L


 


Alkaline Phosphatase     ()  U/L


 


Total Protein     (6.4-8.2)  g/dL


 


Albumin     (3.4-5.0)  g/dL


 


Globulin     (2.6-4.0)  g/dL


 


Albumin/Globulin Ratio     (0.9-1.6)  


 


TSH 3rd Generation     (0.36-3.74)  uIU/mL


 


Urine Color  YELLOW    


 


Urine Appearance  SLT CLOUDY    


 


Urine pH  6.5    (5.0-8.0)  


 


Ur Specific Gravity  1.015    (1.001-1.035)  


 


Urine Protein  NEGATIVE    (NEGATIVE)  mg/dL


 


Urine Glucose (UA)  NEGATIVE    (NEGATIVE)  mg/dL


 


Urine Ketones  NEGATIVE    (NEGATIVE)  mg/dL


 


Urine Occult Blood  NEGATIVE    (NEGATIVE)  


 


Urine Nitrite  NEGATIVE    (NEGATIVE)  


 


Urine Bilirubin  NEGATIVE    (NEGATIVE)  


 


Urine Urobilinogen  0.2    (<2.0)  EU/dL


 


Ur Leukocyte Esterase  SMALL H    (NEGATIVE)  


 


Urine RBC  0-2    (0-2/HPF)  


 


Urine WBC  2-4    (0-5/HPF)  


 


Ur Epithelial Cells  MODERATE    (NONE-FEW)  


 


Amorphous Sediment  LIGHT    (NEGATIVE)  


 


Urine Bacteria  2+ H    (NEGATIVE)  


 


Urine Mucus  MODERATE    (NONE-MOD)  


 


Urine HCG, Qual   NEGATIVE   (NEGATIVE)  


 


Salicylates     (0-20)  mg/dL


 


Urine Opiates Screen    NEGATIVE  (NEGATIVE)  


 


Ur Oxycodone Screen    NEGATIVE  (NEGATIVE)  


 


Urine Methadone Screen    NEGATIVE  (NEGATIVE)  


 


Acetaminophen     ug/mL


 


Ur Barbiturates Screen    NEGATIVE  (NEGATIVE)  


 


Ur Phencyclidine Scrn    NEGATIVE  (NEGATIVE)  


 


Ur Amphetamine Screen    NEGATIVE  (NEGATIVE)  


 


U Methamphetamines Scrn    NEGATIVE  (NEGATIVE)  


 


U Benzodiazepines Scrn    NEGATIVE  (NEGATIVE)  


 


U Cocaine Metab Screen    NEGATIVE  (NEGATIVE)  


 


U Marijuana (THC) Screen    NEGATIVE  (NEGATIVE)  


 


Ethyl Alcohol     mg/dL


 


SARS-CoV-2 RNA (FRITZ)     (NEGATIVE)  














  04/15/21 04/15/21 04/15/21 Range/Units





  00:05 00:05 01:02 


 


WBC  7.60    (4.0-11.0)  K/uL


 


RBC  4.08 L    (4.30-5.90)  M/uL


 


Hgb  11.7 L    (12.0-16.0)  g/dL


 


Hct  35.5 L    (36.0-46.0)  %


 


MCV  87.0    (80.0-98.0)  fL


 


MCH  28.7    (27.0-32.0)  pg


 


MCHC  33.0    (31.0-37.0)  g/dL


 


RDW Std Deviation  41.0    (28.0-62.0)  fl


 


RDW Coeff of Lissa  13    (11.0-15.0)  %


 


Plt Count  319    (150-400)  K/uL


 


MPV  9.00    (7.40-12.00)  fL


 


Neut % (Auto)  57.3    (48.0-80.0)  %


 


Lymph % (Auto)  33.9    (16.0-40.0)  %


 


Mono % (Auto)  7.1    (0.0-15.0)  %


 


Eos % (Auto)  1.6    (0.0-7.0)  %


 


Baso % (Auto)  0.1    (0.0-1.5)  %


 


Neut # (Auto)  4.4    (1.4-5.7)  K/uL


 


Lymph # (Auto)  2.6 H    (0.6-2.4)  K/uL


 


Mono # (Auto)  0.5    (0.0-0.8)  K/uL


 


Eos # (Auto)  0.1    (0.0-0.7)  K/uL


 


Baso # (Auto)  0.0    (0.0-0.1)  K/uL


 


Nucleated RBC %  0.0    /100WBC


 


Nucleated RBCs #  0    K/uL


 


Sodium   140   (136-145)  mmol/L


 


Potassium   3.5   (3.5-5.1)  mmol/L


 


Chloride   104   ()  mmol/L


 


Carbon Dioxide   27.3   (21.0-32.0)  mmol/L


 


BUN   15   (7.0-18.0)  mg/dL


 


Creatinine   1.1 H   (0.6-1.0)  mg/dL


 


Est Cr Clr Drug Dosing   73.83   mL/min


 


Estimated GFR (MDRD)   > 60.0   ml/min


 


Glucose   113 H   ()  mg/dL


 


Calcium   8.9   (8.5-10.1)  mg/dL


 


Magnesium   2.3   (1.8-2.4)  mg/dL


 


Total Bilirubin   0.3   (0.2-1.0)  mg/dL


 


AST   16   (15-37)  IU/L


 


ALT   24   (14-63)  IU/L


 


Alkaline Phosphatase   114   ()  U/L


 


Total Protein   7.5   (6.4-8.2)  g/dL


 


Albumin   3.7   (3.4-5.0)  g/dL


 


Globulin   3.8   (2.6-4.0)  g/dL


 


Albumin/Globulin Ratio   1.0   (0.9-1.6)  


 


TSH 3rd Generation   3.15   (0.36-3.74)  uIU/mL


 


Urine Color     


 


Urine Appearance     


 


Urine pH     (5.0-8.0)  


 


Ur Specific Gravity     (1.001-1.035)  


 


Urine Protein     (NEGATIVE)  mg/dL


 


Urine Glucose (UA)     (NEGATIVE)  mg/dL


 


Urine Ketones     (NEGATIVE)  mg/dL


 


Urine Occult Blood     (NEGATIVE)  


 


Urine Nitrite     (NEGATIVE)  


 


Urine Bilirubin     (NEGATIVE)  


 


Urine Urobilinogen     (<2.0)  EU/dL


 


Ur Leukocyte Esterase     (NEGATIVE)  


 


Urine RBC     (0-2/HPF)  


 


Urine WBC     (0-5/HPF)  


 


Ur Epithelial Cells     (NONE-FEW)  


 


Amorphous Sediment     (NEGATIVE)  


 


Urine Bacteria     (NEGATIVE)  


 


Urine Mucus     (NONE-MOD)  


 


Urine HCG, Qual     (NEGATIVE)  


 


Salicylates   1.2   (0-20)  mg/dL


 


Urine Opiates Screen     (NEGATIVE)  


 


Ur Oxycodone Screen     (NEGATIVE)  


 


Urine Methadone Screen     (NEGATIVE)  


 


Acetaminophen   <2.0   ug/mL


 


Ur Barbiturates Screen     (NEGATIVE)  


 


Ur Phencyclidine Scrn     (NEGATIVE)  


 


Ur Amphetamine Screen     (NEGATIVE)  


 


U Methamphetamines Scrn     (NEGATIVE)  


 


U Benzodiazepines Scrn     (NEGATIVE)  


 


U Cocaine Metab Screen     (NEGATIVE)  


 


U Marijuana (THC) Screen     (NEGATIVE)  


 


Ethyl Alcohol   3   mg/dL


 


SARS-CoV-2 RNA (FRITZ)    NEGATIVE  (NEGATIVE)  














Departure





- Departure


Time of Disposition: 02:04


Disposition: DC/Tfer to Psych Hosp/[facility]


Condition: Good


Clinical Impression: 


 Depressive disorder, Suicidal ideation








- Discharge Information


Referrals: 


PCP,None [Primary Care Provider] - 


Forms:  ED Department Discharge





Sepsis Event Note (ED)





- Evaluation


Sepsis Screening Result: No Definite Risk





- Focused Exam


Vital Signs: 


                                   Vital Signs











  Temp Pulse Resp BP Pulse Ox


 


 04/15/21 01:45   60  18  117/66  97


 


 04/15/21 01:09   62  18  112/71  99


 


 04/15/21 00:16   73  18  118/85  97


 


 21 23:35  98.1 F  66  18  121/76  98














- My Orders


Last 24 Hours: 


My Active Orders





21 23:39


EKG Documentation Completion [RC] STAT 





21 23:58


Communication Order [RC] STAT 





04/15/21 00:05


LITHIUM [REF] Stat 














- Assessment/Plan


Last 24 Hours: 


My Active Orders





21 23:39


EKG Documentation Completion [RC] STAT 





21 23:58


Communication Order [RC] STAT 





04/15/21 00:05


LITHIUM [REF] Stat

## 2022-04-06 ENCOUNTER — HOSPITAL ENCOUNTER (EMERGENCY)
Dept: HOSPITAL 56 - MW.ED | Age: 27
Discharge: HOME | End: 2022-04-06
Payer: COMMERCIAL

## 2022-04-06 VITALS — SYSTOLIC BLOOD PRESSURE: 111 MMHG | HEART RATE: 79 BPM | DIASTOLIC BLOOD PRESSURE: 57 MMHG

## 2022-04-06 DIAGNOSIS — E16.2: ICD-10-CM

## 2022-04-06 DIAGNOSIS — K62.5: Primary | ICD-10-CM

## 2022-04-06 LAB
BUN SERPL-MCNC: 11 MG/DL (ref 7–18)
CHLORIDE SERPL-SCNC: 103 MMOL/L (ref 98–107)
CO2 SERPL-SCNC: 27.3 MMOL/L (ref 21–32)
GLUCOSE SERPL-MCNC: 61 MG/DL (ref 74–106)
POTASSIUM SERPL-SCNC: 3.5 MMOL/L (ref 3.5–5.1)
SODIUM SERPL-SCNC: 138 MMOL/L (ref 136–145)

## 2022-08-27 ENCOUNTER — HOSPITAL ENCOUNTER (EMERGENCY)
Dept: HOSPITAL 56 - MW.ED | Age: 27
Discharge: HOME | End: 2022-08-27
Payer: COMMERCIAL

## 2022-08-27 DIAGNOSIS — Z79.899: ICD-10-CM

## 2022-08-27 DIAGNOSIS — R10.84: Primary | ICD-10-CM

## 2022-08-27 LAB
BUN SERPL-MCNC: 8 MG/DL (ref 7–18)
CHLORIDE SERPL-SCNC: 104 MMOL/L (ref 98–107)
CO2 SERPL-SCNC: 23.8 MMOL/L (ref 21–32)
EGFRCR SERPLBLD CKD-EPI 2021: 127 ML/MIN (ref 60–?)
GLUCOSE SERPL-MCNC: 131 MG/DL (ref 74–106)
LIPASE SERPL-CCNC: 138 U/L (ref 73–393)
POTASSIUM SERPL-SCNC: 3.3 MMOL/L (ref 3.5–5.1)
SODIUM SERPL-SCNC: 136 MMOL/L (ref 136–145)

## 2022-08-27 PROCEDURE — 74177 CT ABD & PELVIS W/CONTRAST: CPT

## 2022-08-27 PROCEDURE — 85025 COMPLETE CBC W/AUTO DIFF WBC: CPT

## 2022-08-27 PROCEDURE — 80053 COMPREHEN METABOLIC PANEL: CPT

## 2022-08-27 PROCEDURE — 96361 HYDRATE IV INFUSION ADD-ON: CPT

## 2022-08-27 PROCEDURE — 36415 COLL VENOUS BLD VENIPUNCTURE: CPT

## 2022-08-27 PROCEDURE — 83690 ASSAY OF LIPASE: CPT

## 2022-08-27 PROCEDURE — 99284 EMERGENCY DEPT VISIT MOD MDM: CPT

## 2022-08-27 PROCEDURE — 96374 THER/PROPH/DIAG INJ IV PUSH: CPT

## 2022-10-19 ENCOUNTER — HOSPITAL ENCOUNTER (OUTPATIENT)
Dept: HOSPITAL 56 - MW.SDS | Age: 27
Discharge: HOME | End: 2022-10-19
Attending: SURGERY
Payer: COMMERCIAL

## 2022-10-19 DIAGNOSIS — R45.851: ICD-10-CM

## 2022-10-19 DIAGNOSIS — F32.A: ICD-10-CM

## 2022-10-19 DIAGNOSIS — F41.9: ICD-10-CM

## 2022-10-19 DIAGNOSIS — E11.9: ICD-10-CM

## 2022-10-19 DIAGNOSIS — Z98.890: ICD-10-CM

## 2022-10-19 DIAGNOSIS — K62.5: Primary | ICD-10-CM

## 2022-10-19 DIAGNOSIS — Z79.899: ICD-10-CM

## 2022-10-19 PROCEDURE — 45380 COLONOSCOPY AND BIOPSY: CPT

## 2022-12-03 ENCOUNTER — HOSPITAL ENCOUNTER (EMERGENCY)
Dept: HOSPITAL 56 - MW.ED | Age: 27
Discharge: HOME | End: 2022-12-03
Payer: COMMERCIAL

## 2022-12-03 DIAGNOSIS — F17.210: ICD-10-CM

## 2022-12-03 DIAGNOSIS — Z20.822: ICD-10-CM

## 2022-12-03 DIAGNOSIS — J06.9: Primary | ICD-10-CM

## 2022-12-03 LAB
FLUAV RNA UPPER RESP QL NAA+PROBE: NEGATIVE
FLUBV RNA UPPER RESP QL NAA+PROBE: NEGATIVE
SARS-COV-2 RNA RESP QL NAA+PROBE: NEGATIVE

## 2022-12-03 PROCEDURE — 87634 RSV DNA/RNA AMP PROBE: CPT

## 2022-12-03 PROCEDURE — 99283 EMERGENCY DEPT VISIT LOW MDM: CPT

## 2022-12-03 PROCEDURE — 0240U: CPT

## 2022-12-03 PROCEDURE — 93005 ELECTROCARDIOGRAM TRACING: CPT

## 2023-09-02 ENCOUNTER — HOSPITAL ENCOUNTER (EMERGENCY)
Dept: HOSPITAL 56 - MW.ED | Age: 28
Discharge: HOME | End: 2023-09-02
Payer: COMMERCIAL

## 2023-09-02 DIAGNOSIS — Z20.822: ICD-10-CM

## 2023-09-02 DIAGNOSIS — J40: Primary | ICD-10-CM

## 2023-09-02 PROCEDURE — 71045 X-RAY EXAM CHEST 1 VIEW: CPT

## 2023-09-02 PROCEDURE — 93005 ELECTROCARDIOGRAM TRACING: CPT

## 2023-09-02 PROCEDURE — 99285 EMERGENCY DEPT VISIT HI MDM: CPT

## 2023-09-02 PROCEDURE — 0240U: CPT
